# Patient Record
Sex: MALE | Race: WHITE | NOT HISPANIC OR LATINO | URBAN - METROPOLITAN AREA
[De-identification: names, ages, dates, MRNs, and addresses within clinical notes are randomized per-mention and may not be internally consistent; named-entity substitution may affect disease eponyms.]

---
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
## 2021-05-18 NOTE — ED ADULT NURSE NOTE - MUSCULOSKELETAL ASSESSMENT
Reviewed inpatient cardiac rehabilitation education with the patient. Phase 2 Cardiac Rehab referral will be made to [facility], [phone].  A home exercise prescription, activity restrictions & precautions, and appropriate risk factor education have been completed. All education is documented in the Cardiac Rehabilitation Education Record. Total time spent educating the patient was 30 minutes.         - - -

## 2021-05-30 NOTE — ED ADULT NURSE NOTE - RADIATION
Refill Approved    Rx renewed per Medication Renewal Policy. Medication was last renewed on 7/18/18.    Sheryl Vigil, [facility] Triage/Med Refill 7/29/2019     Requested Prescriptions   Pending Prescriptions Disp Refills     hydroCHLOROthiazide (HYDRODIURIL) 50 MG tablet [Pharmacy Med Name: HYDROCHLOROTHIAZIDE 50 MG Tablet] 45 tablet 6     Sig: TAKE 1/2 TABLET EVERY DAY (SUBSTITUTED FOR  HYDRODIURIL)       Diuretics/Combination Diuretics Refill Protocol  Passed - 7/29/2019  5:56 PM        Passed - Visit with PCP or prescribing provider visit in past 12 months     Last office visit with prescriber/PCP: Visit date not found OR same dept: 12/19/2018 Luis Enrique Coyle MD OR same specialty: 7/16/2019 Denita Cottrell MD  Last physical: Visit date not found Last MTM visit: Visit date not found   Next visit within 3 mo: Visit date not found  Next physical within 3 mo: Visit date not found  Prescriber OR PCP: Inder Loera MD  Last diagnosis associated with med order: 1. Essential hypertension  - hydroCHLOROthiazide (HYDRODIURIL) 50 MG tablet [Pharmacy Med Name: HYDROCHLOROTHIAZIDE 50 MG Tablet]; TAKE 1/2 TABLET EVERY DAY (SUBSTITUTED FOR  HYDRODIURIL)  Dispense: 45 tablet; Refill: 6    If protocol passes may refill for 12 months if within 3 months of last provider visit (or a total of 15 months).             Passed - Serum Potassium in past 12 months      Lab Results   Component Value Date    Potassium 4.0 07/24/2019             Passed - Serum Sodium in past 12 months      Lab Results   Component Value Date    Sodium 136 07/24/2019             Passed - Blood pressure on file in past 12 months     BP Readings from Last 1 Encounters:   07/24/19 136/74             Passed - Serum Creatinine in past 12 months      Creatinine   Date Value Ref Range Status   07/24/2019 0.70 0.60 - 1.10 mg/dL Final                          no radiation
